# Patient Record
Sex: MALE | Race: OTHER | HISPANIC OR LATINO | Employment: OTHER | URBAN - METROPOLITAN AREA
[De-identification: names, ages, dates, MRNs, and addresses within clinical notes are randomized per-mention and may not be internally consistent; named-entity substitution may affect disease eponyms.]

---

## 2023-10-03 ENCOUNTER — TELEPHONE (OUTPATIENT)
Dept: NEUROLOGY | Facility: CLINIC | Age: 58
End: 2023-10-03

## 2023-10-03 NOTE — TELEPHONE ENCOUNTER
Called number on file which is the brothers number via  ( # [phone])  to schedule a follow up visit with Dr Emmett Lim. Appt scheduled and confirmed.

## 2023-10-05 ENCOUNTER — HOSPITAL ENCOUNTER (OUTPATIENT)
Dept: GASTROENTEROLOGY | Facility: AMBULARY SURGERY CENTER | Age: 58
Setting detail: OUTPATIENT SURGERY
Discharge: HOME | End: 2023-10-05
Attending: INTERNAL MEDICINE

## 2023-10-05 DIAGNOSIS — K22.70 BARRETT'S ESOPHAGUS WITHOUT DYSPLASIA: ICD-10-CM

## 2023-10-05 DIAGNOSIS — K59.09 OTHER CONSTIPATION: ICD-10-CM

## 2023-10-11 ENCOUNTER — OFFICE VISIT (OUTPATIENT)
Dept: INTERNAL MEDICINE CLINIC | Facility: CLINIC | Age: 58
End: 2023-10-11

## 2023-10-11 VITALS
HEART RATE: 85 BPM | HEIGHT: 60 IN | BODY MASS INDEX: 26.5 KG/M2 | WEIGHT: 135 LBS | SYSTOLIC BLOOD PRESSURE: 124 MMHG | OXYGEN SATURATION: 99 % | DIASTOLIC BLOOD PRESSURE: 83 MMHG | TEMPERATURE: 97.6 F

## 2023-10-11 DIAGNOSIS — R45.1 AGITATION: ICD-10-CM

## 2023-10-11 DIAGNOSIS — Z23 ENCOUNTER FOR IMMUNIZATION: ICD-10-CM

## 2023-10-11 DIAGNOSIS — G47.00 INSOMNIA, UNSPECIFIED TYPE: ICD-10-CM

## 2023-10-11 DIAGNOSIS — K05.30 PERIODONTITIS: ICD-10-CM

## 2023-10-11 DIAGNOSIS — M81.0 OSTEOPOROSIS WITHOUT CURRENT PATHOLOGICAL FRACTURE, UNSPECIFIED OSTEOPOROSIS TYPE: Primary | ICD-10-CM

## 2023-10-11 PROCEDURE — 99215 OFFICE O/P EST HI 40 MIN: CPT | Performed by: INTERNAL MEDICINE

## 2023-10-11 PROCEDURE — 90686 IIV4 VACC NO PRSV 0.5 ML IM: CPT | Performed by: INTERNAL MEDICINE

## 2023-10-11 PROCEDURE — 90471 IMMUNIZATION ADMIN: CPT | Performed by: INTERNAL MEDICINE

## 2023-10-11 RX ORDER — MIRTAZAPINE 15 MG/1
15 TABLET, FILM COATED ORAL
Qty: 30 TABLET | Refills: 5 | Status: SHIPPED | OUTPATIENT
Start: 2023-10-11

## 2023-10-11 RX ORDER — HYDROXYZINE HYDROCHLORIDE 25 MG/1
25 TABLET, FILM COATED ORAL EVERY 6 HOURS PRN
Qty: 30 TABLET | Refills: 2 | Status: SHIPPED | OUTPATIENT
Start: 2023-10-11 | End: 2023-10-18

## 2023-10-11 NOTE — PROGRESS NOTES
[facility]  INTERNAL MEDICINE OFFICE VISIT     PATIENT INFORMATION     Akbar Currie   63 y.o. male   MRN: [id number]    ASSESSMENT/PLAN     Diagnoses and all orders for this visit:    Osteoporosis without current pathological fracture, unspecified osteoporosis type  The patient has a history of severe osteoporosis on 2018 DEXA scan presumed to be due to Dilantin and Tegretol use in the past. Has not had full course of reclast treatment. PTH normal, Ca normal, testosterone ordered but not completed, will re-order. Pending the results of the DEXA scan the patient will likely require reinitiation of Reclast infusions. Will r/o hypogonadism with testosterone level. Patient also had SPEP & Serum immunofixation shows a monoclonal immunoglobulin identified as IgG kappa (M-Peak 1= 0.45 g/dL). Most likely this represents MGUS, however we will order further workup as below. -     Testosterone, free, total; Future  -     IgG, IgA, IgM; Future  -     Page/Lambda Light Chains Free With Ratio, Serum; Future    Encounter for immunization  -     influenza vaccine, quadrivalent, 0.5 mL, preservative-free, for adult and pediatric patients 6 mos+ (AFLURIA, FLUARIX, FLULAVAL, FLUZONE)    Agitation  -     hydrOXYzine HCL (ATARAX) 25 mg tablet; Take 1 tablet (25 mg total) by mouth every 6 (six) hours as needed for agitation    Insomnia, unspecified type  -     mirtazapine (REMERON) 15 mg tablet; Take 1 tablet (15 mg total) by mouth daily at bedtime  - Stop trazodone    Periodontitis  -     Ambulatory Referral to Dentistry; Future    Schedule a follow-up appointment in 6 weeks.     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 He was previously seen in our clinic in June for this and repeat DEXA was scheduled (11/21/23) and lab work performed including PTH, testosterone, SPEP. Unfortunately testosterone level was not collected. The patient was accompanied by his brother who provided the history as the patient is non-verbal. He is unsure regarding prior administrations of Reclast as the patient was previously under the care of the patient's grandmother. On chart review, appears patient had undergone 1-2 transfusions of Reclast. He states the patient has had no recent falls or injuries. With regard to lab work, PTH was normal, SPEP demonstrated IgG Kappa elevation. The brother also describes that the patient has been more agitated the past 3 months. He's not certain as to the cause but believes it is due to a combination of anxiety, poor sleep, and poor dental hygiene. Patient has trouble with falling asleep at night and is followed by neurology and has recently had medication adjustments due to somnolence including decreasing of zonisamide and addition of clobazam. Brother states patient does not indicate any pain during these episodes and his agitation resolves if he is not provided with extra attention. Patient was referred to GI for colon cancer screening and follow up of mendoza's esophagus. Colonoscopy was planned for cancer screening and for evaluation of chronic constipation. However, was unable to tolerate the bowel prep per patient's brother. Per recent GI note, their plan is to schedule EDG to evaluate mendoza's esophagus. He continues on omeprazole and tums.      REVIEW OF SYSTEMS     Review of Systems   HENT:  Positive for dental problem. Gastrointestinal:  Positive for constipation. Genitourinary:  Positive for difficulty urinating. Psychiatric/Behavioral:  Positive for agitation. OBJECTIVE     Vitals:    10/11/23 1436   BP: 124/83   BP Location: Right arm   Patient Position: Sitting   Cuff Size: Large   Pulse: 85   Temp: 97.6 °F (36.4 °C)   TempSrc: Temporal   SpO2: 99%   Weight: 61.2 kg (135 lb)   Height: 4' 10.5" (1.486 m)     Physical Exam  Vitals and nursing note reviewed. Constitutional:       General: He is not in acute distress. Appearance: He is well-developed. HENT:      Head: Normocephalic and atraumatic. Mouth/Throat:      Mouth: Mucous membranes are moist.      Comments: Periodontal disease present, no areas of ulceration, discharge, or thrush identified  Eyes:      Conjunctiva/sclera: Conjunctivae normal.   Cardiovascular:      Rate and Rhythm: Normal rate and regular rhythm. Heart sounds: No murmur heard. Pulmonary:      Effort: Pulmonary effort is normal. No respiratory distress. Breath sounds: Normal breath sounds. Abdominal:      Palpations: Abdomen is soft. Tenderness: There is no abdominal tenderness. Musculoskeletal:         General: No swelling. Cervical back: Neck supple. Right lower leg: No edema. Left lower leg: No edema. Skin:     General: Skin is warm and dry. Capillary Refill: Capillary refill takes less than 2 seconds. Neurological:      Mental Status: He is alert. Mental status is at baseline.    Psychiatric:         Mood and Affect: Mood normal.       CURRENT MEDICATIONS     Current Outpatient Medications:     bisacodyl (DULCOLAX) 5 mg EC tablet, Take 2 tablets (10 mg total) by mouth once for 1 dose, Disp: 2 tablet, Rfl: 0    calcium carbonate 1250 MG capsule, Take by mouth, Disp: , Rfl:     Cholecalciferol (Vitamin D3) 50 MCG (2000 UT) capsule, TAKE ONE CAPSULE BY MOUTH DAILY, Disp: 90 capsule, Rfl: 11    cloBAZam (ONFI) 10 MG tablet, Half pill at bedtime for one week and then take 1 pill at bedtime. , Disp: 30 tablet, Rfl: 5    docusate sodium (COLACE) 100 mg capsule, Take 1 capsule (100 mg total) by mouth 2 (two) times a day, Disp: 60 capsule, Rfl: 11    levETIRAcetam (KEPPRA) 1000 MG tablet, Take 1.5 tablets (1,500 mg total) by mouth 2 (two) times a day, Disp: 270 tablet, Rfl: 1    levETIRAcetam (Keppra) 250 mg tablet, Take 1 tablet (250 mg total) by mouth 2 (two) times a day Take in addition to one and a half 1000 mg tablets twice per day for total dose of 1750 mg twice per day., Disp: 180 tablet, Rfl: 3    omeprazole (PriLOSEC) 20 mg delayed release capsule, TAKE 1 CAPSULE BY MOUTH ONCE A DAY AS NEEDED, Disp: 90 capsule, Rfl: 4    ondansetron (ZOFRAN) 4 mg tablet, Take 1 tablet (4 mg total) by mouth every 6 (six) hours for 3 days, Disp: 12 tablet, Rfl: 0    polyethylene glycol (GOLYTELY) 4000 mL solution, Take 4,000 mL by mouth once for 1 dose, Disp: 4000 mL, Rfl: 0    tamsulosin (FLOMAX) 0.4 mg, Take 1 capsule (0.4 mg total) by mouth daily with dinner, Disp: 30 capsule, Rfl: 3    traZODone (DESYREL) 50 mg tablet, TAKE 1 TABLET (50 MG TOTAL) BY MOUTH DAILY AT BEDTIME, Disp: 30 tablet, Rfl: 2    zonisamide (ZONEGRAN) 100 mg capsule, Take 4 capsules (400 mg total) by mouth daily at bedtime, Disp: 360 capsule, Rfl: 1    PAST MEDICAL & SURGICAL HISTORY     Past Medical History:   Diagnosis Date    Epilepsy ([address])     Gastritis     MR (mental retardation)     Osteoporosis     Seizures ([address])      Past Surgical History:   Procedure Laterality Date    DENTAL SURGERY      IN EGD TRANSORAL BIOPSY SINGLE/MULTIPLE N/A 2/8/2018    Procedure: ESOPHAGOGASTRODUODENOSCOPY (EGD); Surgeon: Gerardo Davila DO;  Location: [facility];   Service: Gastroenterology     SOCIAL & FAMILY HISTORY     Social History     Socioeconomic History    Marital status: Single     Spouse name: Not on file    Number of children: Not on file    Years of education: Not on file    Highest education level: Not on file   Occupational History    Not on file   Tobacco Use    Smoking status: Never    Smokeless tobacco: Never   Vaping Use    Vaping Use: Never used   Substance and Sexual Activity    Alcohol use: No    Drug use: No    Sexual activity: Not on file   Other Topics Concern    Not on file   Social History Narrative    Not on file     Social Determinants of Health     Financial Resource Strain: Low Risk  (2/1/2023)    Overall Financial Resource Strain (CARDIA)     Difficulty of Paying Living Expenses: Not hard at all   Food Insecurity: No Food Insecurity (2/1/2023)    Hunger Vital Sign     Worried About Running Out of Food in the Last Year: Never true     [address] in the Last Year: Never true   Transportation Needs: No Transportation Needs (2/1/2023)    PRAPARE - Transportation     Lack of Transportation (Medical): No     Lack of Transportation (Non-Medical): No   Physical Activity: Not on file   Stress: Not on file   Social Connections: Not on file   Intimate Partner Violence: Not on file   Housing Stability: Low Risk  (2/1/2023)    Housing Stability Vital Sign     Unable to Pay for Housing in the Last Year: No     Number of Places Lived in the Last Year: 1     Unstable Housing in the Last Year: No     Social History     Substance and Sexual Activity   Alcohol Use No       Social History     Substance and Sexual Activity   Drug Use No     Social History     Tobacco Use   Smoking Status Never   Smokeless Tobacco Never     Family History   Problem Relation Age of Onset    Diabetes Mother     Heart disease Mother     Kidney failure Mother     Hypertension Mother     Cholecystitis Father     Diabetes Father     Hypertension Father     Diabetes Brother     Hypertension Brother     Kidney failure Brother     Hyperlipidemia Brother     Stroke Brother              ==  Malaika Daniels MD  [address], [address] [place] [address]  Office: [phone]  Fax: [phone]

## 2023-10-16 ENCOUNTER — TELEPHONE (OUTPATIENT)
Dept: INTERNAL MEDICINE CLINIC | Facility: CLINIC | Age: 58
End: 2023-10-16

## 2023-10-16 NOTE — TELEPHONE ENCOUNTER
Patient Brother stopped into office regarding his brother medication  Hydroxyzine HCL 25 MG. Is not working with keeping him calm or helps him sleep. Brother states he is constantly screaming. Brother doesn't get much sleep. He is requesting if another medication can be given or if the Hydroxyzine can be increased? Please call Patient Brother to be advised on above issue.

## 2023-10-18 DIAGNOSIS — R45.1 AGITATION: Primary | ICD-10-CM

## 2023-10-18 RX ORDER — HYDROXYZINE 50 MG/1
50 TABLET, FILM COATED ORAL 3 TIMES DAILY PRN
Qty: 30 TABLET | Refills: 2 | Status: SHIPPED | OUTPATIENT
Start: 2023-10-18

## 2023-10-19 ENCOUNTER — TELEPHONE (OUTPATIENT)
Dept: INTERNAL MEDICINE CLINIC | Facility: CLINIC | Age: 58
End: 2023-10-19

## 2023-10-19 NOTE — TELEPHONE ENCOUNTER
Patients brother Joey Sanchez came into the office very concerned. Per Joey Sanchez, patient has become more aggressive and does not sleep. Patient is currently taking mirtazapine 15mg, However, Joey Sanchze states it is not working and is asking for the medication to be increased and if there is any medication that can help with the aggression.

## 2023-10-20 DIAGNOSIS — G47.00 INSOMNIA, UNSPECIFIED TYPE: Primary | ICD-10-CM

## 2023-10-20 RX ORDER — QUETIAPINE FUMARATE 50 MG/1
25 TABLET, FILM COATED ORAL
Qty: 14 TABLET | Refills: 0 | Status: SHIPPED | OUTPATIENT
Start: 2023-10-20 | End: 2023-10-25 | Stop reason: SDUPTHER

## 2023-10-20 NOTE — TELEPHONE ENCOUNTER
Dr. Ludivina Deleon recently saw this patient. He will discuss with Dr. Pk Sawyer and advise. Thanks.

## 2023-10-25 ENCOUNTER — TELEPHONE (OUTPATIENT)
Dept: GASTROENTEROLOGY | Facility: AMBULARY SURGERY CENTER | Age: 58
End: 2023-10-25

## 2023-10-25 ENCOUNTER — OFFICE VISIT (OUTPATIENT)
Dept: INTERNAL MEDICINE CLINIC | Facility: CLINIC | Age: 58
End: 2023-10-25

## 2023-10-25 VITALS
HEART RATE: 73 BPM | SYSTOLIC BLOOD PRESSURE: 125 MMHG | TEMPERATURE: 97.3 F | DIASTOLIC BLOOD PRESSURE: 85 MMHG | OXYGEN SATURATION: 98 % | BODY MASS INDEX: 27.73 KG/M2 | WEIGHT: 135 LBS

## 2023-10-25 DIAGNOSIS — R45.1 AGITATION: Primary | ICD-10-CM

## 2023-10-25 DIAGNOSIS — G47.00 INSOMNIA, UNSPECIFIED TYPE: ICD-10-CM

## 2023-10-25 PROCEDURE — 99214 OFFICE O/P EST MOD 30 MIN: CPT | Performed by: INTERNAL MEDICINE

## 2023-10-25 RX ORDER — QUETIAPINE FUMARATE 25 MG/1
50 TABLET, FILM COATED ORAL 2 TIMES DAILY
Qty: 120 TABLET | Refills: 0 | Status: SHIPPED | OUTPATIENT
Start: 2023-10-25 | End: 2023-11-24

## 2023-10-25 NOTE — PATIENT INSTRUCTIONS
1) Take seroquel 25mg in the morning, then 50 at night  2) he can take 12.5 Seroquel as needed up to 2 times a day   3) Stop taking atarax  4) Take 1 week 7.5 remeron then stop

## 2023-10-25 NOTE — PROGRESS NOTES
Assessment/Plan:    1. Agitation  -Patient's brother states he is having episodes of agitation. 15 minutes with minimal relief with Atarax  -We will also prescribe Seroquel 25 in the morning on top of the 50 mg at night  -Also instructed patient to give 12.5 Seroquel as needed for up to 2 doses for agitation  -We will stop Atarax  -Discussed reevaluating at next visit    2. Insomnia, unspecified type  -Previously prescribed Seroquel for insomnia with significant improvement  -We will continue Seroquel 50 at bedtime and stop Remeron with a 1 week taper to 7.5 followed cessation the following week            Patient Instructions   1) Take seroquel 25mg in the morning, then 50 at night  2) he can take 12.5 Seroquel as needed up to 2 times a day   3) Stop taking atarax  4) Take 1 week 7.5 remeron then stop     No follow-ups on file. Subjective:      Patient ID: Jadon Rosas is a 63 y.o. male. 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 The brother states he was previously told to use atarax prn when he agitated and was increased from 25 to 48. He was also given seroquel hs to assist with sleeping. He states his brother has these episodes every 15 minutes; he states the Atarax helps with the agitation but is short-lived. Patient is also aware he is scheduled for DEXA scan and has labs to be performed. The following portions of the patient's history were reviewed and updated as appropriate: allergies, current medications, past family history, past medical history, past social history, past surgical history and problem list.    Review of Systems   Reason unable to perform ROS: patient does not respond to questioning at baseline.          Current Outpatient Medications   Medication Sig Dispense Refill    bisacodyl (DULCOLAX) 5 mg EC tablet Take 2 tablets (10 mg total) by mouth once for 1 dose 2 tablet 0    calcium carbonate 1250 MG capsule Take by mouth      Cholecalciferol (Vitamin D3) 50 MCG (2000 UT) capsule TAKE ONE CAPSULE BY MOUTH DAILY 90 capsule 11    cloBAZam (ONFI) 10 MG tablet Half pill at bedtime for one week and then take 1 pill at bedtime. 30 tablet 5    docusate sodium (COLACE) 100 mg capsule Take 1 capsule (100 mg total) by mouth 2 (two) times a day 60 capsule 11    levETIRAcetam (KEPPRA) 1000 MG tablet Take 1.5 tablets (1,500 mg total) by mouth 2 (two) times a day 270 tablet 1    levETIRAcetam (Keppra) 250 mg tablet Take 1 tablet (250 mg total) by mouth 2 (two) times a day Take in addition to one and a half 1000 mg tablets twice per day for total dose of 1750 mg twice per day. 180 tablet 3    mirtazapine (REMERON) 15 mg tablet Take 1 tablet (15 mg total) by mouth daily at bedtime 30 tablet 5    omeprazole (PriLOSEC) 20 mg delayed release capsule TAKE 1 CAPSULE BY MOUTH ONCE A DAY AS NEEDED 90 capsule 4    ondansetron (ZOFRAN) 4 mg tablet Take 1 tablet (4 mg total) by mouth every 6 (six) hours for 3 days 12 tablet 0    polyethylene glycol (GOLYTELY) 4000 mL solution Take 4,000 mL by mouth once for 1 dose 4000 mL 0    QUEtiapine (SEROquel) 25 mg tablet Take 2 tablets (50 mg total) by mouth 2 (two) times a day Take 2 tablets before bed and 1 tablet in the morning. You can take 0.5 tablets during the day as needed for agitation up to 2 times per day. 120 tablet 0    tamsulosin (FLOMAX) 0.4 mg Take 1 capsule (0.4 mg total) by mouth daily with dinner 30 capsule 3    zonisamide (ZONEGRAN) 100 mg capsule Take 4 capsules (400 mg total) by mouth daily at bedtime 360 capsule 1     No current facility-administered medications for this visit.        Objective:    /85 (BP Location: Right arm, Patient Position: Sitting, Cuff Size: Standard)   Pulse 73   Temp (!) 97.3 °F (36.3 °C) (Temporal)   Wt 61.2 kg (135 lb)   SpO2 98%   BMI 27.73 kg/m²        Physical Exam  Constitutional:       General: He is not in acute distress. Appearance: Normal appearance. He is normal weight. He is not ill-appearing or toxic-appearing. Cardiovascular:      Rate and Rhythm: Normal rate and regular rhythm. Heart sounds: No murmur heard. No gallop. Pulmonary:      Effort: Pulmonary effort is normal. No respiratory distress. Breath sounds: No wheezing or rales. Neurological:      Mental Status: He is alert.                 Elayne Levin, [address] [place] [address] Internal Medicine PGY-2

## 2023-10-25 NOTE — TELEPHONE ENCOUNTER
Can you please verify if colonoscopy and Endoscopy were completed? I can't seem to find any notes stating of completed or canceled.   Thank you

## 2023-10-27 NOTE — TELEPHONE ENCOUNTER
Per Yomaira Mike- The patient came in for his procedure on the morning of 10/5. However, the case was cancelled in pre-op because he did not prep for the colonoscopy. So neither procedure was performed.

## 2023-11-13 PROBLEM — Z12.11 SCREENING FOR COLORECTAL CANCER: Status: RESOLVED | Noted: 2018-01-29 | Resolved: 2023-11-13

## 2023-11-13 PROBLEM — Z12.12 SCREENING FOR COLORECTAL CANCER: Status: RESOLVED | Noted: 2018-01-29 | Resolved: 2023-11-13

## 2023-11-15 DIAGNOSIS — G40.319 INTRACTABLE GENERALIZED IDIOPATHIC EPILEPSY WITHOUT STATUS EPILEPTICUS (HCC): ICD-10-CM

## 2023-11-16 RX ORDER — LEVETIRACETAM 1000 MG/1
1500 TABLET ORAL 2 TIMES DAILY
Qty: 270 TABLET | Refills: 3 | Status: SHIPPED | OUTPATIENT
Start: 2023-11-16

## 2023-11-17 DIAGNOSIS — G47.00 INSOMNIA, UNSPECIFIED TYPE: ICD-10-CM

## 2023-11-17 RX ORDER — QUETIAPINE FUMARATE 25 MG/1
TABLET, FILM COATED ORAL
Qty: 120 TABLET | Refills: 0 | Status: SHIPPED | OUTPATIENT
Start: 2023-11-17

## 2023-12-05 ENCOUNTER — OFFICE VISIT (OUTPATIENT)
Dept: INTERNAL MEDICINE CLINIC | Facility: CLINIC | Age: 58
End: 2023-12-05

## 2023-12-05 ENCOUNTER — PREP FOR PROCEDURE (OUTPATIENT)
Age: 58
End: 2023-12-05

## 2023-12-05 VITALS
WEIGHT: 139 LBS | OXYGEN SATURATION: 97 % | TEMPERATURE: 98.6 F | BODY MASS INDEX: 28.56 KG/M2 | SYSTOLIC BLOOD PRESSURE: 125 MMHG | DIASTOLIC BLOOD PRESSURE: 85 MMHG | HEART RATE: 79 BPM

## 2023-12-05 DIAGNOSIS — L60.2 ONYCHOGRYPHOSIS: ICD-10-CM

## 2023-12-05 DIAGNOSIS — M81.0 OSTEOPOROSIS WITHOUT CURRENT PATHOLOGICAL FRACTURE, UNSPECIFIED OSTEOPOROSIS TYPE: Primary | ICD-10-CM

## 2023-12-05 DIAGNOSIS — K22.719 BARRETT'S ESOPHAGUS WITH DYSPLASIA: ICD-10-CM

## 2023-12-05 DIAGNOSIS — Z12.11 COLON CANCER SCREENING: Primary | ICD-10-CM

## 2023-12-05 DIAGNOSIS — R45.1 AGITATION: ICD-10-CM

## 2023-12-05 DIAGNOSIS — Z12.11 SCREENING FOR COLON CANCER: ICD-10-CM

## 2023-12-05 DIAGNOSIS — G40.909 NONINTRACTABLE EPILEPSY WITHOUT STATUS EPILEPTICUS, UNSPECIFIED EPILEPSY TYPE (HCC): ICD-10-CM

## 2023-12-05 PROCEDURE — 99214 OFFICE O/P EST MOD 30 MIN: CPT | Performed by: STUDENT IN AN ORGANIZED HEALTH CARE EDUCATION/TRAINING PROGRAM

## 2023-12-05 PROCEDURE — 93000 ELECTROCARDIOGRAM COMPLETE: CPT | Performed by: STUDENT IN AN ORGANIZED HEALTH CARE EDUCATION/TRAINING PROGRAM

## 2023-12-05 NOTE — PROGRESS NOTES
[address] [facility]  INTERNAL MEDICINE OFFICE VISIT     PATIENT INFORMATION     Dewayne Patricia   63 y.o. male   MRN: [id number]    ASSESSMENT/PLAN     Diagnoses and all orders for this visit:    Osteoporosis without current pathological fracture, unspecified osteoporosis type  The patient has a history of severe osteoporosis on 2018 DEXA scan presumed to be due to Dilantin and Tegretol use in the past. Has not had full course of reclast treatment. PTH normal, Ca normal, testosterone ordered but not completed, will re-order. Pending the results of the DEXA scan, the patient will likely require reinitiation of Reclast infusions.  -     DXA bone density spine hip and pelvis; Future  - Pt instructed to complete prior ordered labs including testosterone    Agitation  Agitation significantly improved from with Seroquel 50 mg HS. Pt was taking morning dose incorrectly as outlined at prior visit. He was taking 50 mg HS and 25 mg in the morning prn. This was clarified today, as below. He remains with intermittent daytime agitation. Physical exam notable for significant periodontal disease, without abscess or ulceration. Otherwise no physical exam findings to explain agitation. Will pursue EEG to rule out underlying seizure activity. -     POCT ECG performed to r/o prolonged QTc: NSR w/o q waves or ischemic changes, no T wave inversions, Qtc 412 ms  -  Continue Seroquel 50 mg HS  - Continue Seroquel 25 mg AM  -  Continue Seroquel 12.5-25 mg as needed for agitation during the day  -     Ambulatory EEG 48 Hours; Standing    Nonintractable epilepsy without status epilepticus, unspecified epilepsy type (HCC)  Agitation improving on Seroquel, will check keppra level and EEG to r/o possible seizures   -     Levetiracetam level;  Future  -     Ambulatory EEG 48 Hours; Standing    Claudio's esophagus with dysplasia  Prior EGD/Colonoscopy canceled due to poor preparation  -     Ambulatory Referral to Gastroenterology; Future    Screening for colon cancer  -     Ambulatory Referral to Gastroenterology; Future    Onychogryphosis  -     Ambulatory Referral to Podiatry; Future      Schedule a follow-up appointment in 6 months for annual physical.    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 Setswana language line used for interpretation    Patient was seen in our clinic previously for the above concerns. He was scheduled for a DEXA scan in November, but this was not completed due to confusion regarding which facility the appt was at. Agitation: Pt caretaker report improved night time agitation on the 50 mg of Seroquel, however he remains with frequent daytime symptoms. Trazodone, atarax, and mirtazapine have been discontinued previously. He was instructed to take 50 mg of Seroquel at night and 25 in the morning with an additional 12.5-25 mg during the day as needed. However, patient caretaker was not following this recommendation and was using 50 mg at night and 25 in the morning as needed. Caretaker endorses 4 weeks of left ankle swelling which is worse in the morning and improved at night. States it has been occasionally difficult to put the left shoe on. He reports no injury, wounds, drainage, fevers, difficulty walking.      REVIEW OF SYSTEMS     Review of Systems Constitutional:  Negative for chills and fever. HENT:  Negative for ear pain and sore throat. Eyes:  Negative for pain and visual disturbance. Respiratory:  Positive for cough. Negative for shortness of breath. Cardiovascular:  Negative for chest pain and palpitations. Gastrointestinal:  Negative for abdominal pain and vomiting. Genitourinary:  Negative for dysuria and hematuria. Musculoskeletal:  Positive for joint swelling. Negative for arthralgias and back pain. Skin:  Negative for color change and rash. Neurological:  Negative for seizures and syncope. Psychiatric/Behavioral:  Positive for agitation. All other systems reviewed and are negative. OBJECTIVE   There were no vitals filed for this visit. Physical Exam  Vitals and nursing note reviewed. Constitutional:       General: He is not in acute distress. Appearance: He is well-developed. HENT:      Head: Normocephalic and atraumatic. Mouth/Throat:      Comments: Periodontal disease evident  No abscess or open sores/ulcers  Eyes:      Conjunctiva/sclera: Conjunctivae normal.   Cardiovascular:      Rate and Rhythm: Normal rate and regular rhythm. Heart sounds: No murmur heard. Pulmonary:      Effort: Pulmonary effort is normal. No respiratory distress. Breath sounds: Normal breath sounds. Abdominal:      Palpations: Abdomen is soft. Tenderness: There is no abdominal tenderness. Musculoskeletal:      Cervical back: Neck supple. Comments: Left ankle with slight swelling present  No warmth or signs of synovitis  No rash or wound  No tenderness of the left ankle joint with palpation and manual manipulation  Gait: walks with assistance of caretaker, no signs to suggest pain of the ankle or knee joint b/l   No calf swelling or tenderness  B/l DP 2+  Onychogryphosis b/l    Skin:     General: Skin is warm and dry. Capillary Refill: Capillary refill takes less than 2 seconds.    Neurological: Mental Status: He is alert. Comments: Alert  Non-verbal   Psychiatric:         Mood and Affect: Mood normal.       CURRENT MEDICATIONS     Current Outpatient Medications:     bisacodyl (DULCOLAX) 5 mg EC tablet, Take 2 tablets (10 mg total) by mouth once for 1 dose, Disp: 2 tablet, Rfl: 0    calcium carbonate 1250 MG capsule, Take by mouth, Disp: , Rfl:     Cholecalciferol (Vitamin D3) 50 MCG (2000 UT) capsule, TAKE ONE CAPSULE BY MOUTH DAILY, Disp: 90 capsule, Rfl: 11    cloBAZam (ONFI) 10 MG tablet, Half pill at bedtime for one week and then take 1 pill at bedtime. , Disp: 30 tablet, Rfl: 5    docusate sodium (COLACE) 100 mg capsule, Take 1 capsule (100 mg total) by mouth 2 (two) times a day, Disp: 60 capsule, Rfl: 11    levETIRAcetam (KEPPRA) 1000 MG tablet, TAKE 1.5 TABLETS (1,500 MG TOTAL) BY MOUTH 2 (TWO) TIMES A DAY, Disp: 270 tablet, Rfl: 3    levETIRAcetam (Keppra) 250 mg tablet, Take 1 tablet (250 mg total) by mouth 2 (two) times a day Take in addition to one and a half 1000 mg tablets twice per day for total dose of 1750 mg twice per day., Disp: 180 tablet, Rfl: 3    mirtazapine (REMERON) 15 mg tablet, Take 1 tablet (15 mg total) by mouth daily at bedtime, Disp: 30 tablet, Rfl: 5    omeprazole (PriLOSEC) 20 mg delayed release capsule, TAKE 1 CAPSULE BY MOUTH ONCE A DAY AS NEEDED, Disp: 90 capsule, Rfl: 4    ondansetron (ZOFRAN) 4 mg tablet, Take 1 tablet (4 mg total) by mouth every 6 (six) hours for 3 days, Disp: 12 tablet, Rfl: 0    polyethylene glycol (GOLYTELY) 4000 mL solution, Take 4,000 mL by mouth once for 1 dose, Disp: 4000 mL, Rfl: 0    QUEtiapine (SEROquel) 25 mg tablet, take one tablet in the morning and two tablets at bedtime.  May take one-half tablet during the day for upto 2 times a day as needed for agitation, Disp: 120 tablet, Rfl: 0    tamsulosin (FLOMAX) 0.4 mg, Take 1 capsule (0.4 mg total) by mouth daily with dinner, Disp: 30 capsule, Rfl: 3    zonisamide (ZONEGRAN) 100 mg capsule, Take 4 capsules (400 mg total) by mouth daily at bedtime, Disp: 360 capsule, Rfl: 1    PAST MEDICAL & SURGICAL HISTORY     Past Medical History:   Diagnosis Date    Epilepsy ([address])     Gastritis     MR (mental retardation)     Osteoporosis     Seizures ([address])      Past Surgical History:   Procedure Laterality Date    DENTAL SURGERY      [place] EGD TRANSORAL BIOPSY SINGLE/MULTIPLE N/A 2/8/2018    Procedure: ESOPHAGOGASTRODUODENOSCOPY (EGD); Surgeon: Unique Cantu DO;  Location: [facility]; Service: Gastroenterology     SOCIAL & FAMILY HISTORY     Social History     Socioeconomic History    Marital status: Single     Spouse name: Not on file    Number of children: Not on file    Years of education: Not on file    Highest education level: Not on file   Occupational History    Not on file   Tobacco Use    Smoking status: Never    Smokeless tobacco: Never   Vaping Use    Vaping Use: Never used   Substance and Sexual Activity    Alcohol use: No    Drug use: No    Sexual activity: Not on file   Other Topics Concern    Not on file   Social History Narrative    Not on file     Social Determinants of Health     Financial Resource Strain: Low Risk  (2/1/2023)    Overall Financial Resource Strain (CARDIA)     Difficulty of Paying Living Expenses: Not hard at all   Food Insecurity: No Food Insecurity (2/1/2023)    Hunger Vital Sign     Worried About Running Out of Food in the Last Year: Never true     [address] in the Last Year: Never true   Transportation Needs: No Transportation Needs (2/1/2023)    PRAPARE - Transportation     Lack of Transportation (Medical): No     Lack of Transportation (Non-Medical):  No   Physical Activity: Not on file   Stress: Not on file   Social Connections: Not on file   Intimate Partner Violence: Not on file   Housing Stability: Low Risk  (2/1/2023)    Housing Stability Vital Sign     Unable to Pay for Housing in the Last Year: No     Number of Places Lived in the Last Year: 1     Unstable Housing in the Last Year: No     Social History     Substance and Sexual Activity   Alcohol Use No       Social History     Substance and Sexual Activity   Drug Use No     Social History     Tobacco Use   Smoking Status Never   Smokeless Tobacco Never     Family History   Problem Relation Age of Onset    Diabetes Mother     Heart disease Mother     Kidney failure Mother     Hypertension Mother     Cholecystitis Father     Diabetes Father     Hypertension Father     Diabetes Brother     Hypertension Brother     Kidney failure Brother     Hyperlipidemia Brother     Stroke Brother              ==  Denver Power, MD  [address] [place] [address] [address], [address] [place] [address] Road  Office: [phone]  Fax: [phone]

## 2023-12-16 DIAGNOSIS — G40.909 NONINTRACTABLE EPILEPSY WITHOUT STATUS EPILEPTICUS, UNSPECIFIED EPILEPSY TYPE (HCC): Primary | ICD-10-CM

## 2023-12-18 ENCOUNTER — CONSULT (OUTPATIENT)
Dept: MULTI SPECIALTY CLINIC | Facility: CLINIC | Age: 58
End: 2023-12-18

## 2023-12-18 VITALS
WEIGHT: 137 LBS | OXYGEN SATURATION: 98 % | DIASTOLIC BLOOD PRESSURE: 69 MMHG | BODY MASS INDEX: 28.15 KG/M2 | SYSTOLIC BLOOD PRESSURE: 105 MMHG | TEMPERATURE: 98.6 F | HEART RATE: 80 BPM

## 2023-12-18 DIAGNOSIS — L60.2 ONYCHOGRYPHOSIS: ICD-10-CM

## 2023-12-18 DIAGNOSIS — F84.0 AUTISM SPECTRUM: ICD-10-CM

## 2023-12-18 DIAGNOSIS — B35.1 ONYCHOMYCOSIS: Primary | ICD-10-CM

## 2023-12-18 PROCEDURE — 99213 OFFICE O/P EST LOW 20 MIN: CPT | Performed by: STUDENT IN AN ORGANIZED HEALTH CARE EDUCATION/TRAINING PROGRAM

## 2023-12-18 NOTE — PROGRESS NOTES
[facility] - Clinic Visit  Jani Saldaña 58 y.o. male MRN: [id number]  Encounter: [id number]    ASSESSMENT:     Diagnoses and all orders for this visit:    Onychomycosis  -     Nail debridement    Onychogryphosis  -     Ambulatory Referral to Podiatry    Autism spectrum         PLAN:    Patient was examined, evaluated, and treated with all questions and concerns addressed. His caretaker was present during the entire encounter  Performed sharp manual debridement of toenails x 10. See procedure note below. Although patient does not meet Q9 findings, given his autism disorder he is unable to cut his own nails appropriately and requires assistance.  Continue use of supportive shoe gear with wide toebox.  Encouraged daily at home foot checks.  RTC in 9 weeks for routine nail care    - Dr. Xavier  was present for entirety of patient encounter.        Nail debridement     Date/Time  12/18/2023 3:13 PM     Performed by  Madiha Lacey DPM   Authorized by  Madiha Lacey DPM     Universal Protocol   Consent: Verbal consent obtained.  Risks and benefits: risks, benefits and alternatives were discussed  Consent given by: guardian     Procedure Details   Procedure Notes: Sharp manual debridement of toenails x 10 using a large nail nipper to reduce thickness, length, and removal subungual debris    Patient tolerance: patient tolerated the procedure well with no immediate complications           SUBJECTIVE:    History of Present Illness     Jani Saldaña is a 58 y.o. male new patient who presents with dystrophic, elongated, thickened toenails x 10. The patient has an autism and mental retardation diagnosis and was nonverbal during the encounter so his caretaker assisted with his history. He has had elongated and thickened toenails for quite some time. He is unable to care for the nails himself and his caretaker states that the nails are too thick for her to cut. The caretaker things the nails are  long enough that they cause him discomfort and pain when putting on shoes and socks..     Patient has no other pedal complaints at this time    Patient denies N/V/F/chills/SOB/CP.     Review of Systems   Unable to perform given patient reduced mental capacity and nonverbal during encounter.    Historical Information   Past Medical History:   Diagnosis Date    Epilepsy (HCC)     Gastritis     MR (mental retardation)     Osteoporosis     Seizures (HCC)      Past Surgical History:   Procedure Laterality Date    DENTAL SURGERY      [place] EGD TRANSORAL BIOPSY SINGLE/MULTIPLE N/A 2/8/2018    Procedure: ESOPHAGOGASTRODUODENOSCOPY (EGD);  Surgeon: Clara Marie DO;  Location:  [facility];  Service: Gastroenterology     Social History   Social History     Substance and Sexual Activity   Alcohol Use No     Social History     Substance and Sexual Activity   Drug Use No     Social History     Tobacco Use   Smoking Status Never   Smokeless Tobacco Never     Family History:   Family History   Problem Relation Age of Onset    Diabetes Mother     Heart disease Mother     Kidney failure Mother     Hypertension Mother     Cholecystitis Father     Diabetes Father     Hypertension Father     Diabetes Brother     Hypertension Brother     Kidney failure Brother     Hyperlipidemia Brother     Stroke Brother        Meds/Allergies   (Not in a hospital admission)    No Known Allergies      OBJECTIVE:    Current Vitals:   Blood Pressure: 105/69 (12/18/23 1450)  Pulse: 80 (12/18/23 1450)  Temperature: 98.6 °F (37 °C) (12/18/23 1450)  Temp Source: Temporal (12/18/23 1450)  Weight - Scale: 62.1 kg (137 lb) (12/18/23 1450)  SpO2: 98 % (12/18/23 1450)    /69 (BP Location: Left arm, Patient Position: Sitting, Cuff Size: Standard)   Pulse 80   Temp 98.6 °F (37 °C) (Temporal)   Wt 62.1 kg (137 lb)   SpO2 98%   BMI 28.15 kg/m²     Lower Extremity Exam:    Physical Exam:    Musculoskeletal:  MMT is 4/5 to all compartments B/L, Ankle  "dorsiflexion < 10 degrees from neutral with leg extended B/L, no pain or guarding during passive joint ROM. Active ROM to the digits intact. No gross deformities noted.     Cardiovascular:  Right DP pulse 2/4, Right PT pulse 1/4, Left DP pulse 2/4, Left PT pulse 1/4, +0/4 edema B/L, CFT< 3 sec to digits. Pedal hair is Absent. Skin temperature warm to warm B/L. No calf tenderness.     Dermatological:  No open Lesions. Skin of the LE is normal temperature, texture, turgor. Mild xerosis to plantar feet bilaterally. Elongated, thickened, discolored toenails x 10       Neurological:  AAOx3. Gross sensation is intact. Protective sensation is Intact.             Imaging: I have personally reviewed pertinent films in PACS  EKG, Pathology, and Other Studies: I have personally reviewed pertinent reports.      ** Please Note: Portions of the record may have been created with voice recognition software. Occasional wrong word or \"sound a like\" substitutions may have occurred due to the inherent limitations of voice recognition software. Read the chart carefully and recognize, using context, where substitutions have occurred. **    "

## 2023-12-19 DIAGNOSIS — G47.00 INSOMNIA, UNSPECIFIED TYPE: ICD-10-CM

## 2023-12-21 ENCOUNTER — HOSPITAL ENCOUNTER (EMERGENCY)
Facility: HOSPITAL | Age: 58
Discharge: HOME | End: 2023-12-21
Attending: EMERGENCY MEDICINE
Payer: COMMERCIAL

## 2023-12-21 ENCOUNTER — APPOINTMENT (EMERGENCY)
Dept: RADIOLOGY | Facility: HOSPITAL | Age: 58
End: 2023-12-21
Payer: COMMERCIAL

## 2023-12-21 ENCOUNTER — TELEPHONE (OUTPATIENT)
Dept: INTERNAL MEDICINE CLINIC | Facility: CLINIC | Age: 58
End: 2023-12-21

## 2023-12-21 VITALS
HEART RATE: 73 BPM | TEMPERATURE: 97.8 F | SYSTOLIC BLOOD PRESSURE: 126 MMHG | OXYGEN SATURATION: 99 % | RESPIRATION RATE: 18 BRPM | DIASTOLIC BLOOD PRESSURE: 83 MMHG

## 2023-12-21 DIAGNOSIS — R52 NONVERBAL SIGNS OF PAIN: ICD-10-CM

## 2023-12-21 DIAGNOSIS — M25.532 PAIN, WRIST, LEFT: Primary | ICD-10-CM

## 2023-12-21 LAB
ALBUMIN SERPL BCP-MCNC: 3.7 G/DL (ref 3.5–5)
ALP SERPL-CCNC: 93 U/L (ref 34–104)
ALT SERPL W P-5'-P-CCNC: 32 U/L (ref 7–52)
ANION GAP SERPL CALCULATED.3IONS-SCNC: 5 MMOL/L
AST SERPL W P-5'-P-CCNC: 37 U/L (ref 13–39)
BASOPHILS # BLD AUTO: 0.05 THOUSANDS/ÂΜL (ref 0–0.1)
BASOPHILS NFR BLD AUTO: 1 % (ref 0–1)
BILIRUB SERPL-MCNC: 0.18 MG/DL (ref 0.2–1)
BUN SERPL-MCNC: 16 MG/DL (ref 5–25)
CALCIUM SERPL-MCNC: 9.1 MG/DL (ref 8.4–10.2)
CHLORIDE SERPL-SCNC: 106 MMOL/L (ref 96–108)
CO2 SERPL-SCNC: 28 MMOL/L (ref 21–32)
CREAT SERPL-MCNC: 1.07 MG/DL (ref 0.6–1.3)
CRP SERPL QL: 22.4 MG/L
EOSINOPHIL # BLD AUTO: 0.38 THOUSAND/ÂΜL (ref 0–0.61)
EOSINOPHIL NFR BLD AUTO: 6 % (ref 0–6)
ERYTHROCYTE [DISTWIDTH] IN BLOOD BY AUTOMATED COUNT: 13.6 % (ref 11.6–15.1)
ERYTHROCYTE [SEDIMENTATION RATE] IN BLOOD: 54 MM/HOUR (ref 0–19)
GFR SERPL CREATININE-BSD FRML MDRD: 76 ML/MIN/1.73SQ M
GLUCOSE SERPL-MCNC: 84 MG/DL (ref 65–140)
HCT VFR BLD AUTO: 37.1 % (ref 36.5–49.3)
HGB BLD-MCNC: 11.3 G/DL (ref 12–17)
IMM GRANULOCYTES # BLD AUTO: 0.01 THOUSAND/UL (ref 0–0.2)
IMM GRANULOCYTES NFR BLD AUTO: 0 % (ref 0–2)
LYMPHOCYTES # BLD AUTO: 2.66 THOUSANDS/ÂΜL (ref 0.6–4.47)
LYMPHOCYTES NFR BLD AUTO: 39 % (ref 14–44)
MCH RBC QN AUTO: 25.5 PG (ref 26.8–34.3)
MCHC RBC AUTO-ENTMCNC: 30.5 G/DL (ref 31.4–37.4)
MCV RBC AUTO: 84 FL (ref 82–98)
MONOCYTES # BLD AUTO: 0.73 THOUSAND/ÂΜL (ref 0.17–1.22)
MONOCYTES NFR BLD AUTO: 11 % (ref 4–12)
NEUTROPHILS # BLD AUTO: 2.99 THOUSANDS/ÂΜL (ref 1.85–7.62)
NEUTS SEG NFR BLD AUTO: 43 % (ref 43–75)
NRBC BLD AUTO-RTO: 0 /100 WBCS
PLATELET # BLD AUTO: 362 THOUSANDS/UL (ref 149–390)
PMV BLD AUTO: 10.5 FL (ref 8.9–12.7)
POTASSIUM SERPL-SCNC: 4.5 MMOL/L (ref 3.5–5.3)
PROT SERPL-MCNC: 7.5 G/DL (ref 6.4–8.4)
RBC # BLD AUTO: 4.44 MILLION/UL (ref 3.88–5.62)
SODIUM SERPL-SCNC: 139 MMOL/L (ref 135–147)
WBC # BLD AUTO: 6.82 THOUSAND/UL (ref 4.31–10.16)

## 2023-12-21 PROCEDURE — 99283 EMERGENCY DEPT VISIT LOW MDM: CPT

## 2023-12-21 PROCEDURE — 85652 RBC SED RATE AUTOMATED: CPT

## 2023-12-21 PROCEDURE — 36415 COLL VENOUS BLD VENIPUNCTURE: CPT

## 2023-12-21 PROCEDURE — 85025 COMPLETE CBC W/AUTO DIFF WBC: CPT

## 2023-12-21 PROCEDURE — 80053 COMPREHEN METABOLIC PANEL: CPT

## 2023-12-21 PROCEDURE — 73090 X-RAY EXAM OF FOREARM: CPT

## 2023-12-21 PROCEDURE — 86140 C-REACTIVE PROTEIN: CPT

## 2023-12-21 PROCEDURE — 96374 THER/PROPH/DIAG INJ IV PUSH: CPT

## 2023-12-21 PROCEDURE — 99284 EMERGENCY DEPT VISIT MOD MDM: CPT | Performed by: EMERGENCY MEDICINE

## 2023-12-21 RX ORDER — KETOROLAC TROMETHAMINE 30 MG/ML
15 INJECTION, SOLUTION INTRAMUSCULAR; INTRAVENOUS ONCE
Status: COMPLETED | OUTPATIENT
Start: 2023-12-21 | End: 2023-12-21

## 2023-12-21 RX ADMIN — KETOROLAC TROMETHAMINE 15 MG: 30 INJECTION, SOLUTION INTRAMUSCULAR; INTRAVENOUS at 14:54

## 2023-12-21 NOTE — ED ATTENDING ATTESTATION
I, Chapito Simon MD, saw and evaluated the patient. I have discussed the patient with the resident and agree with the resident's findings, Plan of Care, and MDM as documented in the resident's note, except where noted. All available labs and Radiology studies were reviewed.  I was present for key portions of any procedure(s) performed by the resident and I was immediately available to provide assistance.    At this point I agree with the current assessment done in the Emergency Department.  I have conducted an independent evaluation of this patient a history and physical is as follows:    57 yo male with a history of autism, cognitive impairment, epilepsy, Claudio's esophagus, nephrolithiasis, insomnia, and severe osteoporosis brought to the ED by family for evaluation of intermittent episodes of pain x 2 weeks. Sister says the patient has been experiencing transient painful episodes for the past few weeks. The location of the pain often changes but is usually in a joint. This morning he was allegedly reporting severe pain in his left forearm and wrist. No pain at present. Family have not noted any fevers at home. No swelling or discoloration. The patient is otherwise acting normally. No relief with APAP or Motrin at home. No other specific complaints.    ROS: per resident physician note    Gen: NAD, nonverbal at baseline  HEENT: PERRL, EOMI  Neck: supple  CV: RRR  Lungs: CTA B/L  Abdomen: soft, NT/ND  Ext: no swelling or deformity  Neuro: 5/5 strength all extremities, sensation grossly intact  Skin: no rash    ED Course  The patient is comfortable appearing with stable vital signs and a benign physical examination. He is nonverbal but at his mental status baseline per family. No current pain. Unclear etiology of symptoms. Unspecified arthritis vs occult fracture vs dehydration vs electrolyte disturbance? Will check basic labs, inflammatory markers, and x-rays of the left forearm. Will continue to monitor in  the ED. Disposition per workup and reassessment.      Critical Care Time  Procedures

## 2023-12-21 NOTE — DISCHARGE INSTRUCTIONS
Debe hacer mohit ashly con sebastian médico de cabecera con respecto a grey episodios de dolor transitorio.  Grey radiografías y grey análisis de laboratorio parecen estar en sebastian estado normal.  Sin embargo, grey marcadores inflamatorios están elevados (PCR y VSG).  Estos pueden estar elevados por varias razones, sin embargo, es posible que la inflamación, los síndromes autoinmunes o las infecciones ocultas puedan estar contribuyendo.  Sin embargo, ninguna de estas son razones por las que necesitaría permanecer en el hospital hoy.  Use tylenol o motrin en casa para el dolor.

## 2023-12-21 NOTE — ED NOTES
Pt is non verbal and family is historian for the pt.  Pt appears to be in no distress at this time.      Amna Mi RN  12/21/23 [id number]

## 2023-12-21 NOTE — ED PROVIDER NOTES
History  Chief Complaint   Patient presents with    Pain     Generalized pain for 2 weeks.  Has tried tylenol and motrin without any relief.  Pt is nonverbal at baseline.  No falls or known trauma.  Hx of osteoporosis.       This is a 58-year-old male who is presenting due to concern for severe pain episodes that have been occurring frequently over the past 2 weeks.  Patient is accompanied by his family members who are his primary caretakers who reports that the patient has been waking up in the morning at a normal time but complaining of severe pain in various parts of his body.  Most of the time the pain seems to be localized around his left forearm and left wrist.  However he has localized pain to his right thigh, left upper arm, left shoulder.  The patient in between these episodes is acting like his completely normal self.  Family does not report any concern for fevers, decreased appetite, decreased activity, night sweats, change in bowel or bladder habits, cough, congestion, fatigue, weakness, or any other new neurologic symptoms.  The patient is unable to provide any subjective history due to being nonverbal at baseline.  He does occasionally nod his head yes and no for review of systems questions.  He is currently not experiencing any acute pain.  He does note chronic pain in his left wrist and left forearm currently.  Family reports that they have been using Tylenol and Motrin at home without much relief of his symptoms.        Prior to Admission Medications   Prescriptions Last Dose Informant Patient Reported? Taking?   Cholecalciferol (Vitamin D3) 50 MCG (2000 UT) capsule  Family Member No No   Sig: TAKE ONE CAPSULE BY MOUTH DAILY   QUEtiapine (SEROquel) 25 mg tablet   No No   Sig: take one tablet in the morning and two tablets at bedtime. May take one-half tablet during the day for upto 2 times a day as needed for agitation   bisacodyl (DULCOLAX) 5 mg EC tablet   No No   Sig: Take 2 tablets (10 mg total)  by mouth once for 1 dose   calcium carbonate 1250 MG capsule  Family Member Yes No   Sig: Take by mouth   cloBAZam (ONFI) 10 MG tablet  Family Member No No   Sig: Half pill at bedtime for one week and then take 1 pill at bedtime.   docusate sodium (COLACE) 100 mg capsule   No No   Sig: Take 1 capsule (100 mg total) by mouth 2 (two) times a day   levETIRAcetam (KEPPRA) 1000 MG tablet   No No   Sig: TAKE 1.5 TABLETS (1,500 MG TOTAL) BY MOUTH 2 (TWO) TIMES A DAY   levETIRAcetam (Keppra) 250 mg tablet  Family Member No No   Sig: Take 1 tablet (250 mg total) by mouth 2 (two) times a day Take in addition to one and a half 1000 mg tablets twice per day for total dose of 1750 mg twice per day.   omeprazole (PriLOSEC) 20 mg delayed release capsule  Family Member No No   Sig: TAKE 1 CAPSULE BY MOUTH ONCE A DAY AS NEEDED   ondansetron (ZOFRAN) 4 mg tablet  Family Member No No   Sig: Take 1 tablet (4 mg total) by mouth every 6 (six) hours for 3 days   polyethylene glycol (GOLYTELY) 4000 mL solution   No No   Sig: Take 4,000 mL by mouth once for 1 dose   tamsulosin (FLOMAX) 0.4 mg  Family Member No No   Sig: Take 1 capsule (0.4 mg total) by mouth daily with dinner   zonisamide (ZONEGRAN) 100 mg capsule   No No   Sig: Take 4 capsules (400 mg total) by mouth daily at bedtime      Facility-Administered Medications: None       Past Medical History:   Diagnosis Date    Epilepsy (HCC)     Gastritis     MR (mental retardation)     Osteoporosis     Seizures (HCC)        Past Surgical History:   Procedure Laterality Date    DENTAL SURGERY      CO EGD TRANSORAL BIOPSY SINGLE/MULTIPLE N/A 2/8/2018    Procedure: ESOPHAGOGASTRODUODENOSCOPY (EGD);  Surgeon: Clara Marie DO;  Location: [facility];  Service: Gastroenterology       Family History   Problem Relation Age of Onset    Diabetes Mother     Heart disease Mother     Kidney failure Mother     Hypertension Mother     Cholecystitis Father     Diabetes Father     Hypertension Father      Diabetes Brother     Hypertension Brother     Kidney failure Brother     Hyperlipidemia Brother     Stroke Brother      I have reviewed and agree with the history as documented.    E-Cigarette/Vaping    E-Cigarette Use Never User      E-Cigarette/Vaping Substances    Nicotine No     THC No     CBD No     Flavoring No     Other No     Unknown No      Social History     Tobacco Use    Smoking status: Never    Smokeless tobacco: Never   Vaping Use    Vaping status: Never Used   Substance Use Topics    Alcohol use: No    Drug use: No        Review of Systems   Unable to perform ROS: Patient nonverbal       Physical Exam  ED Triage Vitals [12/21/23 1220]   Temperature Pulse Respirations Blood Pressure SpO2   97.8 °F (36.6 °C) 73 18 126/83 99 %      Temp Source Heart Rate Source Patient Position - Orthostatic VS BP Location FiO2 (%)   Temporal -- -- -- --      Pain Score       --             Orthostatic Vital Signs  Vitals:    12/21/23 1220   BP: 126/83   Pulse: 73       Physical Exam  Vitals and nursing note reviewed.   Constitutional:       General: He is not in acute distress.     Appearance: He is well-developed and normal weight. He is not toxic-appearing or diaphoretic.   HENT:      Head: Normocephalic and atraumatic.      Right Ear: External ear normal.      Left Ear: External ear normal.      Nose: Nose normal. No congestion or rhinorrhea.      Mouth/Throat:      Mouth: Mucous membranes are moist.      Pharynx: No oropharyngeal exudate or posterior oropharyngeal erythema.   Eyes:      General: No scleral icterus.     Extraocular Movements: Extraocular movements intact.      Conjunctiva/sclera: Conjunctivae normal.      Pupils: Pupils are equal, round, and reactive to light.   Cardiovascular:      Rate and Rhythm: Normal rate and regular rhythm.      Pulses: Normal pulses.      Heart sounds: No murmur heard.  Pulmonary:      Effort: Pulmonary effort is normal. No respiratory distress.      Breath sounds: Normal  breath sounds. No wheezing or rales.   Chest:      Chest wall: No tenderness.   Abdominal:      Palpations: Abdomen is soft. There is no mass.      Tenderness: There is no abdominal tenderness. There is no right CVA tenderness, left CVA tenderness or guarding.      Hernia: No hernia is present.   Musculoskeletal:         General: No swelling. Normal range of motion.      Cervical back: Normal range of motion and neck supple.      Right lower leg: No edema.      Left lower leg: No edema.   Skin:     General: Skin is warm and dry.      Capillary Refill: Capillary refill takes less than 2 seconds.   Neurological:      General: No focal deficit present.      Mental Status: He is alert.   Psychiatric:         Behavior: Behavior normal.         ED Medications  Medications   ketorolac (TORADOL) injection 15 mg (15 mg Intravenous Given 12/21/23 5845)       Diagnostic Studies  Results Reviewed       Procedure Component Value Units Date/Time    Sedimentation rate, automated [[id number]]  (Abnormal) Collected: 12/21/23 1425    Lab Status: Final result Specimen: Blood from Arm, Left Updated: 12/21/23 1535     Sed Rate 54 mm/hour     Comprehensive metabolic panel [[id number]]  (Abnormal) Collected: 12/21/23 1425    Lab Status: Final result Specimen: Blood from Arm, Left Updated: 12/21/23 1503     Sodium 139 mmol/L      Potassium 4.5 mmol/L      Chloride 106 mmol/L      CO2 28 mmol/L      ANION GAP 5 mmol/L      BUN 16 mg/dL      Creatinine 1.07 mg/dL      Glucose 84 mg/dL      Calcium 9.1 mg/dL      AST 37 U/L      ALT 32 U/L      Alkaline Phosphatase 93 U/L      Total Protein 7.5 g/dL      Albumin 3.7 g/dL      Total Bilirubin 0.18 mg/dL      eGFR 76 ml/min/1.73sq m     Narrative:      National Kidney Disease Foundation guidelines for Chronic Kidney Disease (CKD):     Stage 1 with normal or high GFR (GFR > 90 mL/min/1.73 square meters)    Stage 2 Mild CKD (GFR = 60-89 mL/min/1.73 square meters)    Stage 3A Moderate CKD (GFR =  45-59 mL/min/1.73 square meters)    Stage 3B Moderate CKD (GFR = 30-44 mL/min/1.73 square meters)    Stage 4 Severe CKD (GFR = 15-29 mL/min/1.73 square meters)    Stage 5 End Stage CKD (GFR <15 mL/min/1.73 square meters)  Note: GFR calculation is accurate only with a steady state creatinine    C-reactive protein [[id number]]  (Abnormal) Collected: 12/21/23 1425    Lab Status: Final result Specimen: Blood from Arm, Left Updated: 12/21/23 1503     CRP 22.4 mg/L     CBC and differential [[id number]]  (Abnormal) Collected: 12/21/23 1425    Lab Status: Final result Specimen: Blood from Arm, Left Updated: 12/21/23 1438     WBC 6.82 Thousand/uL      RBC 4.44 Million/uL      Hemoglobin 11.3 g/dL      Hematocrit 37.1 %      MCV 84 fL      MCH 25.5 pg      MCHC 30.5 g/dL      RDW 13.6 %      MPV 10.5 fL      Platelets 362 Thousands/uL      nRBC 0 /100 WBCs      Neutrophils Relative 43 %      Immat GRANS % 0 %      Lymphocytes Relative 39 %      Monocytes Relative 11 %      Eosinophils Relative 6 %      Basophils Relative 1 %      Neutrophils Absolute 2.99 Thousands/µL      Immature Grans Absolute 0.01 Thousand/uL      Lymphocytes Absolute 2.66 Thousands/µL      Monocytes Absolute 0.73 Thousand/µL      Eosinophils Absolute 0.38 Thousand/µL      Basophils Absolute 0.05 Thousands/µL                    XR forearm 2 views LEFT   ED Interpretation by Jamari Farris MD (12/21 1506)   No acute fractures or dislocations            Procedures  Procedures      ED Course                             SBIRT 20yo+      Flowsheet Row Most Recent Value   Initial Alcohol Screen: US AUDIT-C     1. How often do you have a drink containing alcohol? 0 Filed at: 12/21/2023 1530   2. How many drinks containing alcohol do you have on a typical day you are drinking?  0 Filed at: 12/21/2023 1530   3a. Male UNDER 65: How often do you have five or more drinks on one occasion? 0 Filed at: 12/21/2023 1530   3b. FEMALE Any Age, or MALE 65+: How often do you  have 4 or more drinks on one occassion? 0 Filed at: 12/21/2023 1530   Audit-C Score 0 Filed at: 12/21/2023 1530   DINO: How many times in the past year have you...    Used an illegal drug or used a prescription medication for non-medical reasons? Never Filed at: 12/21/2023 1537                  Medical Decision Making  Medical complexity: This nonverbal patient is unable to provide much history about what may be causing his symptoms.  Will include lab workup such as CBC to evaluate for occult infection, leukocytosis, will left shift, or even anemia/blood dyscrasia.  Will also include metabolic profile to look for acute renal failure, liver enzyme elevation, potential biliary causes of his symptoms, or acute metabolic disturbance.  Will also include inflammatory markers to further risk stratify for possible autoimmune and inflammatory causes of his symptoms.  Due to the fact that most the patient symptoms have been localized to the left forearm and wrist I will include an x-ray of the forearm and wrist to evaluate for possible occult trauma.    Reassessment/disposition: Patient inflammatory markers are significantly elevated, other lab work within patient's normal baseline.  X-rays were not revealing of any acute fractures or other traumatic injuries.  At this time, patient remains fairly asymptomatic here in the emergency department.  He nods his head no when asked if he is in any pain at this time.  Caretaker stated that he is currently continuing to act like his completely normal self otherwise.  I have no indication for further medical invasive workup or radiographic studies at this time.  Patient will follow-up with his primary care doctor regarding his elevated inflammatory markers especially if his symptoms are persistent and intermittent.  If he has new severe symptoms that family knows to bring him back to the emergency department immediately for reevaluation.    Amount and/or Complexity of Data  Reviewed  Labs: ordered.  Radiology: ordered and independent interpretation performed.    Risk  Prescription drug management.          Disposition  Final diagnoses:   Pain, wrist, left   Nonverbal signs of pain     Time reflects when diagnosis was documented in both MDM as applicable and the Disposition within this note       Time User Action Codes Description Comment    12/21/2023  3:46 PM Jamari Farris Add [M25.532] Pain, wrist, left     12/21/2023  3:48 PM Jamari Farris Add [R52] Nonverbal signs of pain           ED Disposition       ED Disposition   Discharge    Condition   Stable    Date/Time   Thu Dec 21, 2023 1546    Comment   Janilisa Saldaña discharge to home/self care.                   Follow-up Information       Follow up With Specialties Details Why Contact Info Additional Information    [facility] Emergency Department Emergency Medicine Go to  If symptoms worsen, As needed [address] [facility] [address]  [phone] [facility] Emergency Department, [address] [place], [place], [address]   [phone]    [facility] Internal Medicine Schedule an appointment as soon as possible for a visit in 3 days Followup of today's visit [address] [place] [id number] [phone] [facility], [address] [place] [address], [address] [phone]            Discharge Medication List as of 12/21/2023  3:53 PM        CONTINUE these medications which have NOT CHANGED    Details   bisacodyl (DULCOLAX) 5 mg EC tablet Take 2 tablets (10 mg total) by mouth once for 1 dose, Starting Tue 9/26/2023, Normal      calcium carbonate 1250 MG capsule Take by mouth, Starting Mon 2/15/2016, Historical Med      Cholecalciferol (Vitamin D3) 50 MCG (2000 UT) capsule TAKE ONE CAPSULE BY MOUTH DAILY, Normal      cloBAZam (ONFI) 10 MG tablet Half pill at  bedtime for one week and then take 1 pill at bedtime., Normal      docusate sodium (COLACE) 100 mg capsule Take 1 capsule (100 mg total) by mouth 2 (two) times a day, Starting Tue 9/26/2023, Normal      !! levETIRAcetam (KEPPRA) 1000 MG tablet TAKE 1.5 TABLETS (1,500 MG TOTAL) BY MOUTH 2 (TWO) TIMES A DAY, Starting Thu 11/16/2023, Normal      !! levETIRAcetam (Keppra) 250 mg tablet Take 1 tablet (250 mg total) by mouth 2 (two) times a day Take in addition to one and a half 1000 mg tablets twice per day for total dose of 1750 mg twice per day., Starting Wed 5/10/2023, Normal      omeprazole (PriLOSEC) 20 mg delayed release capsule TAKE 1 CAPSULE BY MOUTH ONCE A DAY AS NEEDED, Normal      ondansetron (ZOFRAN) 4 mg tablet Take 1 tablet (4 mg total) by mouth every 6 (six) hours for 3 days, Starting Sun 11/14/2021, Until Wed 10/25/2023, Normal      polyethylene glycol (GOLYTELY) 4000 mL solution Take 4,000 mL by mouth once for 1 dose, Starting Tue 9/26/2023, Normal      QUEtiapine (SEROquel) 25 mg tablet take one tablet in the morning and two tablets at bedtime. May take one-half tablet during the day for upto 2 times a day as needed for agitation, Normal      tamsulosin (FLOMAX) 0.4 mg Take 1 capsule (0.4 mg total) by mouth daily with dinner, Starting Thu 9/14/2023, Normal      zonisamide (ZONEGRAN) 100 mg capsule Take 4 capsules (400 mg total) by mouth daily at bedtime, Starting Thu 9/28/2023, Normal       !! - Potential duplicate medications found. Please discuss with provider.        No discharge procedures on file.    PDMP Review       None             ED Provider  Attending physically available and evaluated Jani Saldaña. I managed the patient along with the ED Attending.    Electronically Signed by           Jamari Farris MD  12/21/23 6167

## 2023-12-22 RX ORDER — QUETIAPINE FUMARATE 25 MG/1
TABLET, FILM COATED ORAL
Qty: 120 TABLET | Refills: 0 | Status: SHIPPED | OUTPATIENT
Start: 2023-12-22

## 2024-01-09 DIAGNOSIS — G47.00 INSOMNIA, UNSPECIFIED TYPE: ICD-10-CM

## 2024-01-09 DIAGNOSIS — N39.43 DRIBBLING URINE: ICD-10-CM

## 2024-01-09 RX ORDER — TAMSULOSIN HYDROCHLORIDE 0.4 MG/1
0.4 CAPSULE ORAL
Qty: 30 CAPSULE | Refills: 2 | Status: SHIPPED | OUTPATIENT
Start: 2024-01-09 | End: 2024-01-16

## 2024-01-09 RX ORDER — QUETIAPINE FUMARATE 25 MG/1
TABLET, FILM COATED ORAL
Qty: 120 TABLET | Refills: 3 | Status: SHIPPED | OUTPATIENT
Start: 2024-01-09

## 2024-01-13 DIAGNOSIS — N39.43 DRIBBLING URINE: ICD-10-CM

## 2024-01-16 RX ORDER — TAMSULOSIN HYDROCHLORIDE 0.4 MG/1
0.4 CAPSULE ORAL
Qty: 30 CAPSULE | Refills: 2 | Status: SHIPPED | OUTPATIENT
Start: 2024-01-16

## 2024-01-22 ENCOUNTER — OFFICE VISIT (OUTPATIENT)
Dept: NEUROLOGY | Facility: CLINIC | Age: 59
End: 2024-01-22
Payer: COMMERCIAL

## 2024-01-22 VITALS
DIASTOLIC BLOOD PRESSURE: 76 MMHG | TEMPERATURE: 97.7 F | SYSTOLIC BLOOD PRESSURE: 136 MMHG | WEIGHT: 134.2 LBS | OXYGEN SATURATION: 98 % | HEIGHT: 60 IN | BODY MASS INDEX: 26.35 KG/M2 | HEART RATE: 68 BPM

## 2024-01-22 DIAGNOSIS — G47.00 INSOMNIA: ICD-10-CM

## 2024-01-22 DIAGNOSIS — R26.81 UNSTEADY GAIT: ICD-10-CM

## 2024-01-22 DIAGNOSIS — R55 SYNCOPE: ICD-10-CM

## 2024-01-22 DIAGNOSIS — G40.319 INTRACTABLE GENERALIZED IDIOPATHIC EPILEPSY WITHOUT STATUS EPILEPTICUS (HCC): Primary | ICD-10-CM

## 2024-01-22 DIAGNOSIS — N20.0 RENAL CALCULI: ICD-10-CM

## 2024-01-22 PROCEDURE — 99215 OFFICE O/P EST HI 40 MIN: CPT | Performed by: PSYCHIATRY & NEUROLOGY

## 2024-01-22 RX ORDER — ZONISAMIDE 100 MG/1
CAPSULE ORAL
Qty: 180 CAPSULE | Refills: 1 | Status: SHIPPED | OUTPATIENT
Start: 2024-01-22

## 2024-01-22 RX ORDER — CLOBAZAM 10 MG/1
10 TABLET ORAL 2 TIMES DAILY
Qty: 180 TABLET | Refills: 1 | Status: SHIPPED | OUTPATIENT
Start: 2024-01-22

## 2024-01-22 RX ORDER — LEVETIRACETAM 250 MG/1
250 TABLET ORAL 2 TIMES DAILY
Qty: 180 TABLET | Refills: 3 | Status: SHIPPED | OUTPATIENT
Start: 2024-01-22

## 2024-01-22 RX ORDER — LEVETIRACETAM 1000 MG/1
1500 TABLET ORAL 2 TIMES DAILY
Qty: 270 TABLET | Refills: 3 | Status: SHIPPED | OUTPATIENT
Start: 2024-01-22

## 2024-01-22 NOTE — PROGRESS NOTES
Review of Systems   Constitutional:  Negative for appetite change, fatigue and fever.   HENT: Negative.  Negative for hearing loss, tinnitus, trouble swallowing and voice change.    Eyes: Negative.  Negative for photophobia, pain and visual disturbance.   Respiratory: Negative.  Negative for shortness of breath.    Cardiovascular: Negative.  Negative for palpitations.   Gastrointestinal: Negative.  Negative for nausea and vomiting.   Endocrine: Negative.  Negative for cold intolerance.   Genitourinary: Negative.  Negative for dysuria, frequency and urgency.   Musculoskeletal:  Negative for back pain, gait problem, myalgias, neck pain and neck stiffness.   Skin: Negative.  Negative for rash.   Allergic/Immunologic: Negative.    Neurological:  Positive for seizures (pts family states last sz was 1/19/24). Negative for dizziness, tremors, syncope, facial asymmetry, speech difficulty, weakness, light-headedness, numbness and headaches.   Hematological: Negative.  Does not bruise/bleed easily.   Psychiatric/Behavioral:  Positive for sleep disturbance (pts family states he wakes up at 1-2am and stays awake). Negative for confusion and hallucinations.    All other systems reviewed and are negative.

## 2024-01-22 NOTE — PROGRESS NOTES
"[facility]  Follow Up Visit    Impression/Plan    Mr. Star Saldaña is a 58 y.o. 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   The most recent seizures occurred in 10/2022 and 9/2018.      I would like to eventually get him off zonisamide due to history of nephrolithiasis. Will decrease zonisamide down to 200 mg over 2 weeks and increase clobazam to 10 mg twice daily. Clobazam might also help with agitation. Consider complete taper off zonisamide at next visit. Consider lacosamide and lamotrigine if another ASM is required.      Seroquel has helped his agitation/aggression. It helps with sleep onset, but then he wakes up. Seroquel is prescribed by Dr. Farris and I am not opposed to an increased dose if it is felt appropriate.     The event overnight last week was syncope. Discussed pathophysiology of syncope and safety/precautions. Syncope on Friday was likely prolonged because his father kept him in an upright position and therefore caused more prolonged cerebral hypoperfusion.     Patient Instructions   Decrease zonisamide to 300 mg nightly for 2 weeks and then decrease to 200 mg nightly.   Increase clobazam to 10 mg twice daily.   Let us know if there are seizures.   The recent event on Friday was \"syncope\".   Talk to PCP about possible Seroquel refills/increase.   Return in about 6 months.     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  -     levETIRAcetam (KEPPRA) 1000 MG tablet; Take 1.5 tablets (1,500 mg total) by mouth 2 (two) times a day  -     levETIRAcetam (Keppra) 250 mg tablet; Take 1 tablet (250 mg total) by mouth 2 (two) times a day Take in addition to one and a half 1000 mg tablets twice per day for total dose of 1750 mg twice per day.  -     cloBAZam (ONFI) 10 MG tablet; Take " "1 tablet (10 mg total) by mouth 2 (two) times a day    Syncope    Renal calculi    Insomnia    Unsteady gait        Subjective    Jani Saldaña is returning to the [facility] for follow up.     Interval Events:   Seizures since last visit: None    Last seen 9/13/2023.  At that time he was having significant agitation/aggression and difficulty urinating which were not problems he had in the past.  Was also noted that he had a 3 mm renal calculus, nonobstructing on CT scan around that time when he was in the emergency department.  There were multiple renal stones on CT in December 2016.  I had a decrease in his mind and added clobazam at night.    Tells me there was a \"seizure\" on Friday. He wanted to use the bathroom overnight. About 2 am. Took him out of the bed and he felt too bad to get to the bathroom. He could barely stand. Father went to get a bucket to have him use for urination. When he was peeing he started going down and his father sat him on the floor. He sat with his head hanging down for 3-4 minutes while in seated position, unresponsive, father maintained his seated position. No shaking. No clear postictal period.     Sleeps to 1-2 am and then won't sleep any more. Quetiapine helps some (25 mg AM / 50 mg PM and prn for agitation).     Current AEDs:  Zonisamide 400 mg at night  Levetiracetam 1750 mg BID  Clobazam 10 mg qhs     Event/Seizure semiology:  GTC seizure     Special Features  Status epilepticus: unknown  Self Injury Seizures: unknown  Precipitating Factors: medication noncompliance     Epilepsy Risk Factors:  CNS infection  Intellectual disability     Prior AEDs:  Phenytoin stopped in early 2015 and replaced by Keppra.    Carbamazepine stopped in 2021 in the setting of osteoporosis, also seizure in 2018 occurred in the setting of high carbamazepine level)     Prior Evaluation:   EEG done 7/1/2008: Epileptiform discharges arising from the right anterior " "quadrant as well as abnormally slow, irregular posterior dominant rhythm as well as continuous slow waves.          EEG done 7/30/2014 (Dr. Abrams): Extensive reactive theta activity both generalized as well as maximum within the left temporal distribution. There is also at least 12-14 times an isolated generalized spike and wave complex noted during the EEG without any ... correlate. There is also isolated left much greater than right sharp waves or spike in wave focus noted.      Routine EEG 5/18/2018:This is an abnormal 29 minutes awake and drowsy EEG due to background slowing and slow posterior rhythm and a single spike-slow wave with an apparent generalized distribution.         MRI done 7/31/14: Stable cerebellar atrophy per report. I think there is also some mild relative right hemisphere atrophy as evidenced by mild relative increase in size of CSF spaces in right lateral frontal lobe.      History Reviewed:   The following were reviewed and updated as appropriate: allergies, current medications, past medical history, past social history and problem list   Last DEXA scan in 1/2017 showed worsening bone mineral density compared to 5/2013 study. He apparently has been on bisphosphonate therapy, but I'm not sure during what time period. Phenytoin was weaned off in early 2015.       Psychiatric History:  Autism spectrum     Social History:   Driving: No  Lives Alone: No  Occupation: on permanent disability      Objective    /76 (BP Location: Right arm, Patient Position: Sitting, Cuff Size: Adult)   Pulse 68   Temp 97.7 °F (36.5 °C) (Temporal)   Ht 4' 10.5\" (1.486 m)   Wt 60.9 kg (134 lb 3.2 oz)   SpO2 98%   BMI 27.57 kg/m²      General Exam  No acute distress.    Neurologic Exam  Mental Status:  Non verbal. Follows simple commands/prompts   Gait: Walks while holding one hand of caregiver for support.         "

## 2024-01-22 NOTE — PATIENT INSTRUCTIONS
"Decrease zonisamide to 300 mg nightly for 2 weeks and then decrease to 200 mg nightly.   Increase clobazam to 10 mg twice daily.   Let us know if there are seizures.   The recent event on Friday was \"syncope\".   Talk to PCP about possible Seroquel refills/increase.   Return in about 6 months.   "

## 2024-01-24 ENCOUNTER — ANESTHESIA (OUTPATIENT)
Dept: ANESTHESIOLOGY | Facility: HOSPITAL | Age: 59
End: 2024-01-24

## 2024-01-24 ENCOUNTER — ANESTHESIA EVENT (OUTPATIENT)
Dept: ANESTHESIOLOGY | Facility: HOSPITAL | Age: 59
End: 2024-01-24

## 2024-04-04 ENCOUNTER — ANESTHESIA (OUTPATIENT)
Dept: ANESTHESIOLOGY | Facility: HOSPITAL | Age: 59
End: 2024-04-04

## 2024-04-04 ENCOUNTER — ANESTHESIA EVENT (OUTPATIENT)
Dept: ANESTHESIOLOGY | Facility: HOSPITAL | Age: 59
End: 2024-04-04

## 2024-04-10 ENCOUNTER — TELEPHONE (OUTPATIENT)
Dept: GASTROENTEROLOGY | Facility: CLINIC | Age: 59
End: 2024-04-10

## 2024-05-03 DIAGNOSIS — N39.43 DRIBBLING URINE: ICD-10-CM

## 2024-05-06 RX ORDER — TAMSULOSIN HYDROCHLORIDE 0.4 MG/1
0.4 CAPSULE ORAL
Qty: 30 CAPSULE | Refills: 3 | Status: SHIPPED | OUTPATIENT
Start: 2024-05-06

## 2024-05-15 DIAGNOSIS — K22.719 BARRETT'S ESOPHAGUS WITH DYSPLASIA: Primary | ICD-10-CM

## 2024-05-15 RX ORDER — PANTOPRAZOLE SODIUM 40 MG/1
TABLET, DELAYED RELEASE ORAL
Qty: 60 TABLET | Refills: 0 | Status: SHIPPED | OUTPATIENT
Start: 2024-05-15

## 2024-05-22 ENCOUNTER — OFFICE VISIT (OUTPATIENT)
Dept: NEUROLOGY | Facility: CLINIC | Age: 59
End: 2024-05-22
Payer: COMMERCIAL

## 2024-05-22 VITALS
DIASTOLIC BLOOD PRESSURE: 63 MMHG | HEIGHT: 60 IN | OXYGEN SATURATION: 98 % | TEMPERATURE: 98.3 F | WEIGHT: 133.4 LBS | SYSTOLIC BLOOD PRESSURE: 97 MMHG | HEART RATE: 84 BPM | BODY MASS INDEX: 26.19 KG/M2

## 2024-05-22 DIAGNOSIS — R26.81 UNSTEADY GAIT: ICD-10-CM

## 2024-05-22 DIAGNOSIS — N20.0 RENAL CALCULI: ICD-10-CM

## 2024-05-22 DIAGNOSIS — G40.319 INTRACTABLE GENERALIZED IDIOPATHIC EPILEPSY WITHOUT STATUS EPILEPTICUS (HCC): Primary | ICD-10-CM

## 2024-05-22 DIAGNOSIS — G47.00 INSOMNIA: ICD-10-CM

## 2024-05-22 DIAGNOSIS — R45.1 AGITATION: ICD-10-CM

## 2024-05-22 PROCEDURE — 99214 OFFICE O/P EST MOD 30 MIN: CPT | Performed by: PSYCHIATRY & NEUROLOGY

## 2024-05-22 RX ORDER — PYRIDOXINE HCL (VITAMIN B6) 100 MG
100 TABLET ORAL DAILY
Qty: 90 TABLET | Refills: 3 | Status: SHIPPED | OUTPATIENT
Start: 2024-05-22

## 2024-05-22 RX ORDER — ZONISAMIDE 100 MG/1
100 CAPSULE ORAL
Qty: 14 CAPSULE | Refills: 0 | Status: SHIPPED | OUTPATIENT
Start: 2024-06-19

## 2024-05-22 RX ORDER — CLOBAZAM 10 MG/1
TABLET ORAL
Qty: 90 TABLET | Refills: 5 | Status: SHIPPED | OUTPATIENT
Start: 2024-05-22

## 2024-05-22 NOTE — PROGRESS NOTES
[facility]  Follow Up Visit    Impression/Plan    Mr. Star Saldaña is a 58 y.o. 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  The most recent seizures occurred in 10/2022 and 9/2018.      Will completely taper off of zonisamide.  There is a history of nephrolithiasis.  Will increase clobazam to 10 mg in the morning and 20 mg at bedtime to address agitation and insomnia as well as to provide additional seizure protection. Consider lacosamide and lamotrigine if another ASM is required.  Adding vitamin B6 which can help address agitation that may be related to levetiracetam.     Seroquel had helped his agitation/aggression. It helps with sleep onset, but then he wakes up.  I am not opposed to additional Seroquel increases if that were thought to be necessary.      Patient Instructions   In one month decrease zonisamide to 100 mg nightly. Stop zonisamide after taking 100 mg nightly for 2 weeks.   Increase clobazam to 10 mg morning and 20 mg at bedtime.   Start vitamin B6 100 mg per day to help with mood while on levetiracetam.   Return in about 6 months.   Let us know if there are seizures.      1. En un mes, reduzca la zonisamida a 100 mg por noche. Suspenda la zonisamida después de selwyn 100 mg todas las noches lena 2 semanas.  2. Aumente clobazam a 10 mg por la mañana y 20 mg antes de acostarse.  3. Comience con 100 mg de vitamina B6 por día para ayudar con el estado de ánimo mientras shady levetiracetam.  4. Regreso en unos 6 meses.  5. Háganos saber si hay convulsiones.     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  -     cloBAZam (ONFI) 10 MG tablet; Take 1  tablet (10 mg total) by mouth every morning AND 2 tablets (20 mg total) daily at bedtime.    Insomnia    Renal calculi    Unsteady gait    Agitation        Subjective    Jani Saldaña is returning to the [facility] for follow up.     Interval Events:   Seizures since last visit: None    Agitation and behavior problems have continued and may be worsened some.  No evidence of seizures recently.  Multiple recent admissions for hematemesis.  Arrives with father and caregiver.  [place]  on iPad.    Current AEDs:  Zonisamide 200 mg at night  Levetiracetam 1750 mg BID  Clobazam 10 mg twice daily     Other medications include:  Quetiapine at bedtime    Event/Seizure semiology:  GTC seizure     Special Features  Status epilepticus: unknown  Self Injury Seizures: unknown  Precipitating Factors: medication noncompliance     Epilepsy Risk Factors:  CNS infection  Intellectual disability     Prior AEDs:  Phenytoin stopped in early 2015 and replaced by Keppra.    Carbamazepine stopped in 2021 in the setting of osteoporosis, also seizure in 2018 occurred in the setting of high carbamazepine level)     Prior Evaluation:   EEG done 7/1/2008: Epileptiform discharges arising from the right anterior quadrant as well as abnormally slow, irregular posterior dominant rhythm as well as continuous slow waves.          EEG done 7/30/2014 (Dr. Abrams): Extensive reactive theta activity both generalized as well as maximum within the left temporal distribution. There is also at least 12-14 times an isolated generalized spike and wave complex noted during the EEG without any ... correlate. There is also isolated left much greater than right sharp waves or spike in wave focus noted.      Routine EEG 5/18/2018:This is an abnormal 29 minutes awake and drowsy EEG due to background slowing and slow posterior rhythm and a single spike-slow wave with an apparent generalized distribution.          "MRI done 7/31/14: Stable cerebellar atrophy per report. I think there is also some mild relative right hemisphere atrophy as evidenced by mild relative increase in size of CSF spaces in right lateral frontal lobe.      History Reviewed:   The following were reviewed and updated as appropriate: allergies, current medications, past medical history, past social history and problem list   Last DEXA scan in 1/2017 showed worsening bone mineral density compared to 5/2013 study. He apparently has been on bisphosphonate therapy, but I'm not sure during what time period. Phenytoin was weaned off in early 2015.       Psychiatric History:  Autism spectrum     Social History:   Driving: No  Lives Alone: No  Occupation: on permanent disability      Objective    BP 97/63 (BP Location: Left arm, Patient Position: Sitting, Cuff Size: Adult)   Pulse 84   Temp 98.3 °F (36.8 °C) (Temporal)   Ht 4' 10.5\" (1.486 m)   Wt 60.5 kg (133 lb 6.4 oz)   SpO2 98%   BMI 27.41 kg/m²      General Exam  No acute distress.    Neurologic Exam  Mental Status:  Awake. Appears drowsy.   Language: nonverbal during visit.   Cranial Nerves: face symmetric.   Gait: mildly wide based, slowed.         "

## 2024-05-22 NOTE — PROGRESS NOTES
Review of Systems   Constitutional:  Negative for appetite change, fatigue and fever.   HENT: Negative.  Negative for hearing loss, tinnitus, trouble swallowing and voice change.    Eyes: Negative.  Negative for photophobia, pain and visual disturbance.   Respiratory: Negative.  Negative for shortness of breath.    Cardiovascular: Negative.  Negative for palpitations.   Gastrointestinal: Negative.  Negative for nausea and vomiting.   Endocrine: Negative.  Negative for cold intolerance.   Genitourinary: Negative.  Negative for dysuria, frequency and urgency.   Musculoskeletal:  Negative for back pain, gait problem, myalgias, neck pain and neck stiffness.   Skin: Negative.  Negative for rash.   Allergic/Immunologic: Negative.    Neurological:  Negative for dizziness, tremors, syncope, facial asymmetry, speech difficulty, weakness, light-headedness, numbness and headaches.   Hematological: Negative.  Does not bruise/bleed easily.   Psychiatric/Behavioral: Negative.  Negative for confusion, hallucinations and sleep disturbance.    All other systems reviewed and are negative.

## 2024-05-22 NOTE — PATIENT INSTRUCTIONS
In one month decrease zonisamide to 100 mg nightly. Stop zonisamide after taking 100 mg nightly for 2 weeks.   Increase clobazam to 10 mg morning and 20 mg at bedtime.   Start vitamin B6 100 mg per day to help with mood while on levetiracetam.   Return in about 6 months.   Let us know if there are seizures.      1. En un mes, reduzca la zonisamida a 100 mg por noche. Suspenda la zonisamida después de selwyn 100 mg todas las noches lena 2 semanas.  2. Aumente clobazam a 10 mg por la mañana y 20 mg antes de acostarse.  3. Comience con 100 mg de vitamina B6 por día para ayudar con el estado de ánimo mientras shady levetiracetam.  4. Regreso en unos 6 meses.  5. Háganos saber si hay convulsiones.

## 2024-05-24 DIAGNOSIS — G47.00 INSOMNIA, UNSPECIFIED TYPE: ICD-10-CM

## 2024-05-24 RX ORDER — QUETIAPINE FUMARATE 25 MG/1
TABLET, FILM COATED ORAL
Qty: 120 TABLET | Refills: 2 | Status: SHIPPED | OUTPATIENT
Start: 2024-05-24

## 2024-06-10 DIAGNOSIS — K22.719 BARRETT'S ESOPHAGUS WITH DYSPLASIA: ICD-10-CM

## 2024-06-11 RX ORDER — PANTOPRAZOLE SODIUM 40 MG/1
TABLET, DELAYED RELEASE ORAL
Qty: 60 TABLET | Refills: 2 | Status: SHIPPED | OUTPATIENT
Start: 2024-06-11

## 2024-06-27 DIAGNOSIS — E55.9 VITAMIN D DEFICIENCY: ICD-10-CM

## 2024-06-27 RX ORDER — LORAZEPAM 0.5 MG
1 TABLET ORAL DAILY
Qty: 90 CAPSULE | Refills: 3 | Status: SHIPPED | OUTPATIENT
Start: 2024-06-27

## 2024-08-21 ENCOUNTER — TELEPHONE (OUTPATIENT)
Dept: NEUROLOGY | Facility: CLINIC | Age: 59
End: 2024-08-21

## 2024-08-21 DIAGNOSIS — G47.00 INSOMNIA, UNSPECIFIED TYPE: ICD-10-CM

## 2024-08-21 RX ORDER — QUETIAPINE FUMARATE 25 MG/1
12.5 TABLET, FILM COATED ORAL 2 TIMES DAILY
Qty: 120 TABLET | Refills: 2 | Status: SHIPPED | OUTPATIENT
Start: 2024-08-21

## 2024-08-21 NOTE — TELEPHONE ENCOUNTER
Pt brother came in office needing a refill on medication QUEtiapine. Stated that the pt needs two doses of the medcation one in the morning and night due to pt having uncontrollable screaming and jumping. Can you please assist?

## 2024-08-30 ENCOUNTER — TELEPHONE (OUTPATIENT)
Age: 59
End: 2024-08-30

## 2024-09-04 DIAGNOSIS — K22.719 BARRETT'S ESOPHAGUS WITH DYSPLASIA: ICD-10-CM

## 2024-09-05 RX ORDER — PANTOPRAZOLE SODIUM 40 MG/1
TABLET, DELAYED RELEASE ORAL
Qty: 60 TABLET | Refills: 1 | Status: SHIPPED | OUTPATIENT
Start: 2024-09-05

## 2024-09-09 DIAGNOSIS — K59.09 OTHER CONSTIPATION: ICD-10-CM

## 2024-09-10 RX ORDER — DOCUSATE SODIUM 100 MG/1
CAPSULE, LIQUID FILLED ORAL
Qty: 60 CAPSULE | Refills: 5 | Status: SHIPPED | OUTPATIENT
Start: 2024-09-10

## 2024-10-17 ENCOUNTER — TELEPHONE (OUTPATIENT)
Dept: INTERNAL MEDICINE CLINIC | Facility: CLINIC | Age: 59
End: 2024-10-17

## 2024-11-04 DIAGNOSIS — N39.43 DRIBBLING URINE: ICD-10-CM

## 2024-11-04 RX ORDER — TAMSULOSIN HYDROCHLORIDE 0.4 MG/1
0.4 CAPSULE ORAL
Qty: 30 CAPSULE | Refills: 1 | Status: SHIPPED | OUTPATIENT
Start: 2024-11-04

## 2024-11-05 DIAGNOSIS — G40.319 INTRACTABLE GENERALIZED IDIOPATHIC EPILEPSY WITHOUT STATUS EPILEPTICUS (HCC): ICD-10-CM

## 2024-11-05 RX ORDER — CLOBAZAM 10 MG/1
TABLET ORAL
Qty: 90 TABLET | Refills: 4 | Status: SHIPPED | OUTPATIENT
Start: 2024-11-05

## 2024-12-30 DIAGNOSIS — N39.43 DRIBBLING URINE: ICD-10-CM

## 2024-12-30 RX ORDER — TAMSULOSIN HYDROCHLORIDE 0.4 MG/1
0.4 CAPSULE ORAL
Qty: 30 CAPSULE | Refills: 0 | Status: SHIPPED | OUTPATIENT
Start: 2024-12-30

## 2024-12-31 NOTE — TELEPHONE ENCOUNTER
Patient brother and caretaker was at dialysis and stated he will call back to schedule a physical when he gets out

## 2025-01-13 DIAGNOSIS — G40.319 INTRACTABLE GENERALIZED IDIOPATHIC EPILEPSY WITHOUT STATUS EPILEPTICUS (HCC): ICD-10-CM

## 2025-01-15 RX ORDER — LEVETIRACETAM 1000 MG/1
TABLET ORAL
Qty: 270 TABLET | Refills: 0 | Status: SHIPPED | OUTPATIENT
Start: 2025-01-15

## 2025-01-20 NOTE — TELEPHONE ENCOUNTER
Patient seen in clinic with Dr. Jose MD for f/u. Denies any changes to health or insurance. Physical exam deferred to MD. Patient active on transplant list.    Patient is currently NOT on dialysis  Blood tranfusions None  Support Plan changes None  Annual Education consent reviewed with patient and patient signed. Copy given to patient and copy sent for scanning.  Last HLA 1/14/2025    Plan to see back in 6 months. Due for   Hep B vaccine series completion and follow up confirmation of immunity with Hep B Quant  Updated Echo stress test (annually) and follow up with cardiology        Px's brother called about the medication QUEtiapine, brother needs it, is currently out of medication

## 2025-01-24 DIAGNOSIS — K22.719 BARRETT'S ESOPHAGUS WITH DYSPLASIA: ICD-10-CM

## 2025-01-24 RX ORDER — PANTOPRAZOLE SODIUM 40 MG/1
TABLET, DELAYED RELEASE ORAL
Qty: 60 TABLET | Refills: 0 | Status: SHIPPED | OUTPATIENT
Start: 2025-01-24

## 2025-01-27 DIAGNOSIS — N39.43 DRIBBLING URINE: ICD-10-CM

## 2025-01-27 RX ORDER — TAMSULOSIN HYDROCHLORIDE 0.4 MG/1
0.4 CAPSULE ORAL
Qty: 30 CAPSULE | Refills: 0 | OUTPATIENT
Start: 2025-01-27

## 2025-01-27 NOTE — TELEPHONE ENCOUNTER
Pt has appointment with [facility] on 1/29, can discuss future refills with them.

## 2025-01-28 DIAGNOSIS — N39.43 DRIBBLING URINE: ICD-10-CM

## 2025-01-28 DIAGNOSIS — G40.319 INTRACTABLE GENERALIZED IDIOPATHIC EPILEPSY WITHOUT STATUS EPILEPTICUS (HCC): ICD-10-CM

## 2025-01-28 RX ORDER — TAMSULOSIN HYDROCHLORIDE 0.4 MG/1
0.4 CAPSULE ORAL
Qty: 30 CAPSULE | Refills: 4 | OUTPATIENT
Start: 2025-01-28

## 2025-01-28 RX ORDER — LEVETIRACETAM 250 MG/1
250 TABLET ORAL 2 TIMES DAILY
Qty: 90 TABLET | Refills: 0 | Status: SHIPPED | OUTPATIENT
Start: 2025-01-28

## 2025-01-28 NOTE — TELEPHONE ENCOUNTER
Patient needs an appointment. Please contact the patient to schedule an appointment. Last office visit:  5/22/24-Return in about 6 months (around 11/22/2024).  Courtesy provided

## 2025-02-04 NOTE — TELEPHONE ENCOUNTER
02/04/25    1st & LAST CALL:     Called Patient.  Spoke to Patient Brother Mr. Grier.  Informed the Reason of My Call.      Per Mr. Grier, patient is currently admitted at [facility] due that he is in need of Extra Care after having COVID.  (PATIENT BROTHER STATED)     Per Mr. Grier he will at the Facility for a while.    Per Mr. Grier they will contact office to reschedule NS / Missed appt from 11/18/24.      If patient or Brother contacts office, please assist with rescheduling appt.    LOV: 05/22/24    Instruction Notes: Return in about 6 months (around 11/22/2024).      Please and Thank You.

## 2025-02-21 DIAGNOSIS — K22.719 BARRETT'S ESOPHAGUS WITH DYSPLASIA: ICD-10-CM

## 2025-02-24 RX ORDER — PANTOPRAZOLE SODIUM 40 MG/1
TABLET, DELAYED RELEASE ORAL
Qty: 60 TABLET | Refills: 0 | OUTPATIENT
Start: 2025-02-24

## 2025-02-25 ENCOUNTER — TELEPHONE (OUTPATIENT)
Dept: NEUROLOGY | Facility: CLINIC | Age: 60
End: 2025-02-25

## 2025-02-25 DIAGNOSIS — K22.719 BARRETT'S ESOPHAGUS WITH DYSPLASIA: ICD-10-CM

## 2025-02-25 DIAGNOSIS — G40.319 INTRACTABLE GENERALIZED IDIOPATHIC EPILEPSY WITHOUT STATUS EPILEPTICUS (HCC): ICD-10-CM

## 2025-02-25 RX ORDER — PANTOPRAZOLE SODIUM 40 MG/1
TABLET, DELAYED RELEASE ORAL
Qty: 60 TABLET | Refills: 0 | OUTPATIENT
Start: 2025-02-25

## 2025-02-25 RX ORDER — LEVETIRACETAM 250 MG/1
TABLET ORAL
Qty: 56 TABLET | Refills: 4 | Status: SHIPPED | OUTPATIENT
Start: 2025-02-25

## 2025-02-26 NOTE — TELEPHONE ENCOUNTER
02/26/25    NO FURTHER ACTION NEEDED.    1st AND LAST CALL:    Called [facility].    Spoke to [place] Brother Mr. Grier.    Per Patient Brother he is in the Hospital for the Past 3-Month back and forth from Hospital to Rehab Fecility.    Appt cant be rescheduled at this time.    Mr. Grier will contact office to reschedule f/u appt if needed.      Any questions, please contact Patient Brother.  Thank You.

## 2025-03-24 DIAGNOSIS — K59.09 OTHER CONSTIPATION: ICD-10-CM

## 2025-03-24 DIAGNOSIS — G40.319 INTRACTABLE GENERALIZED IDIOPATHIC EPILEPSY WITHOUT STATUS EPILEPTICUS (HCC): ICD-10-CM

## 2025-03-25 RX ORDER — DOCUSATE SODIUM 100 MG/1
CAPSULE, LIQUID FILLED ORAL
Qty: 60 CAPSULE | Refills: 4 | Status: SHIPPED | OUTPATIENT
Start: 2025-03-25

## 2025-03-25 RX ORDER — CLOBAZAM 10 MG/1
TABLET ORAL
Qty: 90 TABLET | Refills: 3 | Status: SHIPPED | OUTPATIENT
Start: 2025-03-25

## 2025-03-25 NOTE — TELEPHONE ENCOUNTER
Medication:cloBAZam  PDMP  03/01/2025 11/05/2024 cloBAZam (Tablet) 84.0 28 10 MG NA [facility] Commercial Insurance 4 / 4 [place]   1 [id number] 02/01/2025 11/05/2024 cloBAZam (Tablet) 84.0 28 10 MG NA [facility] Commercial Insurance 3 / 4 [place]   1 [id number] 01/04/2025 11/05/2024 cloBAZam (Tablet) 84.0 28 10 MG NA [facility] Commercial Insurance 2 / 4 [place]   1 [id number] 12/07/2024 11/05/2024 cloBAZam (Tablet) 84.0 28 10 MG NA [facility] Commercial Insurance 1 / 4 [place]  Active agreement on file -No

## 2025-04-09 DIAGNOSIS — G40.319 INTRACTABLE GENERALIZED IDIOPATHIC EPILEPSY WITHOUT STATUS EPILEPTICUS (HCC): ICD-10-CM

## 2025-04-10 RX ORDER — LEVETIRACETAM 1000 MG/1
1500 TABLET ORAL 2 TIMES DAILY
Qty: 270 TABLET | Refills: 1 | Status: SHIPPED | OUTPATIENT
Start: 2025-04-10

## 2025-04-11 NOTE — TELEPHONE ENCOUNTER
Spoke with pt's brother Marvel who advised pt has been living in a rehab . He was unable to confirm if or when he would be coming home he stated that is still yet to be determined and because of that they cannot schedule an appt at this time.

## 2025-04-28 ENCOUNTER — TELEPHONE (OUTPATIENT)
Dept: INTERNAL MEDICINE CLINIC | Facility: CLINIC | Age: 60
End: 2025-04-28

## 2025-04-28 NOTE — TELEPHONE ENCOUNTER
LVM for patient to call  [phone] to schedule annual physical and bp control outcome.letter sent

## 2025-05-19 DIAGNOSIS — E55.9 VITAMIN D DEFICIENCY: ICD-10-CM

## 2025-05-20 RX ORDER — LORAZEPAM 0.5 MG
1 TABLET ORAL DAILY
Qty: 90 CAPSULE | Refills: 7 | Status: SHIPPED | OUTPATIENT
Start: 2025-05-20

## 2025-06-17 DIAGNOSIS — G40.319 INTRACTABLE GENERALIZED IDIOPATHIC EPILEPSY WITHOUT STATUS EPILEPTICUS (HCC): ICD-10-CM

## 2025-06-18 NOTE — TELEPHONE ENCOUNTER
I covering for Dr. Lucio. He received request for refill of medication. Patient has not been seen in over a year. Please call patient to schedule an appointment so we can provide refills to last to that appointment.

## 2025-06-18 NOTE — TELEPHONE ENCOUNTER
Spoke to patient's brother.  Patient is currently in rehab.  He will have the facility call us to schedule.

## 2025-06-19 RX ORDER — LEVETIRACETAM 250 MG/1
TABLET ORAL
Qty: 60 TABLET | Refills: 3 | Status: SHIPPED | OUTPATIENT
Start: 2025-06-19